# Patient Record
Sex: MALE | Race: BLACK OR AFRICAN AMERICAN
[De-identification: names, ages, dates, MRNs, and addresses within clinical notes are randomized per-mention and may not be internally consistent; named-entity substitution may affect disease eponyms.]

---

## 2018-06-03 NOTE — CT
CT BRAIN WITHOUT CONTRAST:

 

06/03/2018

 

FINDINGS:

The ventricles are normal in size with no shift.  No intracranial bleeding or extraaxial hematoma is 
seen.  There is no sign of stroke, mass, or edema.  The skull appears intact and the visible paranasa
l sinuses are clear, as are the mastoid air cells.

 

IMPRESSION:

No acute intracranial findings.

 

POS: HOME

## 2018-06-03 NOTE — RAD
LEFT RIBS WITH PA CHEST:

 

06/03/2018

 

COMPARISON:

11/09/2012

 

FINDINGS:

The heart is normal in size, and the lungs are clear.  No infiltrate or effusion is seen.  There is n
o pneumothorax.  The mediastinum shows no widening or shift.

 

Regarding the left ribs, no fractures are identified.  The patient has scoliosis in the thoracolumbar
 spine.

 

IMPRESSION:

No acute finding.

 

POS: HOME

## 2019-06-14 ENCOUNTER — HOSPITAL ENCOUNTER (EMERGENCY)
Dept: HOSPITAL 57 - BURERS | Age: 34
Discharge: HOME | End: 2019-06-14
Payer: COMMERCIAL

## 2019-06-14 DIAGNOSIS — K12.2: Primary | ICD-10-CM

## 2019-06-14 DIAGNOSIS — F17.210: ICD-10-CM

## 2019-06-14 PROCEDURE — 41008 DRAINAGE OF MOUTH LESION: CPT

## 2019-06-17 ENCOUNTER — HOSPITAL ENCOUNTER (EMERGENCY)
Dept: HOSPITAL 57 - BURERS | Age: 34
Discharge: HOME | End: 2019-06-17
Payer: SELF-PAY

## 2019-06-17 DIAGNOSIS — Z48.817: Primary | ICD-10-CM

## 2019-06-17 DIAGNOSIS — F17.210: ICD-10-CM
